# Patient Record
Sex: FEMALE | Race: WHITE | Employment: OTHER | RURAL
[De-identification: names, ages, dates, MRNs, and addresses within clinical notes are randomized per-mention and may not be internally consistent; named-entity substitution may affect disease eponyms.]

---

## 2018-07-08 PROBLEM — R29.810 FACIAL DROOP: Status: ACTIVE | Noted: 2018-07-08

## 2018-07-09 PROBLEM — G51.0 FACIAL NERVE PALSY: Status: ACTIVE | Noted: 2018-07-08

## 2018-07-09 PROBLEM — H25.11 AGE-RELATED NUCLEAR CATARACT OF RIGHT EYE: Status: ACTIVE | Noted: 2018-02-13

## 2018-07-10 PROBLEM — M25.562 ARTHRALGIA OF BOTH KNEES: Status: ACTIVE | Noted: 2018-07-10

## 2018-07-10 PROBLEM — A69.20 ERYTHEMA MIGRANS (LYME DISEASE): Status: ACTIVE | Noted: 2018-07-10

## 2018-07-10 PROBLEM — A69.20: Status: ACTIVE | Noted: 2018-07-10

## 2018-07-10 PROBLEM — M25.561 ARTHRALGIA OF BOTH KNEES: Status: ACTIVE | Noted: 2018-07-10

## 2019-02-10 PROBLEM — K85.90 ACUTE PANCREATITIS: Status: ACTIVE | Noted: 2019-02-10

## 2021-08-16 ENCOUNTER — HOSPITAL ENCOUNTER (OUTPATIENT)
Dept: MAMMOGRAPHY | Age: 76
Discharge: HOME | End: 2021-08-16
Attending: FAMILY MEDICINE
Payer: MEDICARE

## 2021-08-16 VITALS — WEIGHT: 183 LBS | BODY MASS INDEX: 29.54 KG/M2

## 2021-08-16 DIAGNOSIS — Z12.31 VISIT FOR SCREENING MAMMOGRAM: ICD-10-CM

## 2021-08-16 PROCEDURE — 77063 BREAST TOMOSYNTHESIS BI: CPT

## 2022-03-19 PROBLEM — A69.20: Status: ACTIVE | Noted: 2018-07-10

## 2022-03-19 PROBLEM — A69.20 ERYTHEMA MIGRANS (LYME DISEASE): Status: ACTIVE | Noted: 2018-07-10

## 2022-03-19 PROBLEM — H25.11 AGE-RELATED NUCLEAR CATARACT OF RIGHT EYE: Status: ACTIVE | Noted: 2018-02-13

## 2022-03-19 PROBLEM — K85.90 ACUTE PANCREATITIS: Status: ACTIVE | Noted: 2019-02-10

## 2022-03-19 PROBLEM — M25.561 ARTHRALGIA OF BOTH KNEES: Status: ACTIVE | Noted: 2018-07-10

## 2022-03-19 PROBLEM — G51.0 FACIAL NERVE PALSY: Status: ACTIVE | Noted: 2018-07-08

## 2022-03-19 PROBLEM — M25.562 ARTHRALGIA OF BOTH KNEES: Status: ACTIVE | Noted: 2018-07-10

## 2022-04-28 ENCOUNTER — APPOINTMENT (OUTPATIENT)
Dept: ULTRASOUND IMAGING | Age: 77
End: 2022-04-28
Attending: EMERGENCY MEDICINE
Payer: MEDICARE

## 2022-04-28 ENCOUNTER — HOSPITAL ENCOUNTER (EMERGENCY)
Age: 77
Discharge: HOME | End: 2022-04-28
Attending: EMERGENCY MEDICINE
Payer: MEDICARE

## 2022-04-28 ENCOUNTER — APPOINTMENT (OUTPATIENT)
Dept: GENERAL RADIOLOGY | Age: 77
End: 2022-04-28
Attending: EMERGENCY MEDICINE
Payer: MEDICARE

## 2022-04-28 VITALS
OXYGEN SATURATION: 96 % | DIASTOLIC BLOOD PRESSURE: 60 MMHG | WEIGHT: 180 LBS | RESPIRATION RATE: 17 BRPM | TEMPERATURE: 98.3 F | HEART RATE: 63 BPM | SYSTOLIC BLOOD PRESSURE: 123 MMHG | HEIGHT: 65 IN | BODY MASS INDEX: 29.99 KG/M2

## 2022-04-28 DIAGNOSIS — M25.562 ACUTE PAIN OF LEFT KNEE: Primary | ICD-10-CM

## 2022-04-28 DIAGNOSIS — S83.92XA SPRAIN OF LEFT KNEE, UNSPECIFIED LIGAMENT, INITIAL ENCOUNTER: ICD-10-CM

## 2022-04-28 PROCEDURE — 99284 EMERGENCY DEPT VISIT MOD MDM: CPT

## 2022-04-28 PROCEDURE — 73562 X-RAY EXAM OF KNEE 3: CPT

## 2022-04-28 PROCEDURE — 93971 EXTREMITY STUDY: CPT

## 2022-04-28 RX ORDER — HYDROCODONE BITARTRATE AND ACETAMINOPHEN 5; 325 MG/1; MG/1
1 TABLET ORAL
Qty: 10 TABLET | Refills: 0 | Status: SHIPPED | OUTPATIENT
Start: 2022-04-28 | End: 2022-05-01

## 2022-04-28 NOTE — ED TRIAGE NOTES
Pt arrived POV with c/o L knee pain after twisting it coming down stairs yesterday. Pt able to ambulate back to triage room.

## 2022-04-30 NOTE — ED PROVIDER NOTES
EMERGENCY DEPARTMENT HISTORY AND PHYSICAL EXAM      Date: 4/28/2022  Patient Name: Brunilda Crawford    History of Presenting Illness     Chief Complaint   Patient presents with    Knee Pain       History Provided By: Patient    HPI: Brunilda Crawford, 68 y.o. female presents to the ED with cc of left knee pain. Patient states that she was coming down the steps and stepped wrong and began having pain in her left knee. She states pain is rated 3 out of 10 worse with weightbearing activities. She states decreased range of motion secondary to pain. She denies any other pain or discomfort or injury. She states that she did have recent travel. No prior history of blood clots. She denies any chest pain or shortness of breath. She denies striking her head there was no loss of conscious. She denies any neck or back pain. There are no other complaints, changes, or physical findings at this time. PCP: Alexi Zafar MD    No current facility-administered medications on file prior to encounter. Current Outpatient Medications on File Prior to Encounter   Medication Sig Dispense Refill    rosuvastatin (CRESTOR) 10 mg tablet Take 10 mg by mouth nightly.  artificial tears, dextran 70-hypromellose, (NATURAL BALANCE) 0.1-0.3 % ophthalmic solution Administer 2 Drops to left eye four (4) times daily as needed. Indications: Dry Eye 15 mL 1    lisinopril-hydroCHLOROthiazide (PRINZIDE, ZESTORETIC) 10-12.5 mg per tablet Take 1 Tab by mouth daily.  multivitamin (ONE A DAY) tablet Take 1 Tab by mouth daily.  Calcium-Cholecalciferol, D3, 600 mg(1,500mg) -400 unit cap Take 1 Tab by mouth.          Past History     Past Medical History:  Past Medical History:   Diagnosis Date    Breast cancer ([address]     left with lumpectomy    Radiation therapy complication        Past Surgical History:  Past Surgical History:   Procedure Laterality Date    HX BREAST LUMPECTOMY Left 1993    positive    HX BREAST REDUCTION Bilateral 2002       Family History:  Family History   Problem Relation Age of Onset    Breast Cancer Maternal Aunt        Social History:  Social History     Tobacco Use    Smoking status: Never Smoker    Smokeless tobacco: Never Used   Substance Use Topics    Alcohol use: Yes    Drug use: Not on file       Allergies: Allergies   Allergen Reactions    Statins-Hmg-Coa Reductase Inhibitors Rash    Iodine Rash         Review of Systems   Review of Systems   Constitutional: Negative. Negative for appetite change, chills, fatigue and fever. HENT: Negative. Negative for congestion, rhinorrhea, sinus pressure and sore throat. Eyes: Negative. Respiratory: Negative. Negative for cough, choking, chest tightness, shortness of breath and wheezing. Cardiovascular: Negative. Negative for chest pain, palpitations and leg swelling. Endocrine: Negative. Genitourinary: Negative. Negative for difficulty urinating, dysuria, flank pain and urgency. Musculoskeletal:        Left knee pain      Skin: Negative. Neurological: Negative. Negative for dizziness, speech difficulty, weakness, light-headedness, numbness and headaches. Psychiatric/Behavioral: Negative. All other systems reviewed and are negative. Physical Exam   Physical Exam  Vitals and nursing note reviewed. Constitutional:       General: She is not in acute distress. Appearance: She is well-developed. She is obese. She is not diaphoretic. HENT:      Head: Normocephalic and atraumatic. Mouth/Throat:      Mouth: Mucous membranes are moist.      Pharynx: No oropharyngeal exudate. Eyes:      Extraocular Movements: Extraocular movements intact. Conjunctiva/sclera: Conjunctivae normal.      Pupils: Pupils are equal, round, and reactive to light. Neck:      Vascular: No JVD. Trachea: No tracheal deviation. Cardiovascular:      Rate and Rhythm: Normal rate and regular rhythm.       Heart sounds: Normal heart sounds. No murmur heard. Pulmonary:      Effort: Pulmonary effort is normal. No respiratory distress. Breath sounds: Normal breath sounds. No stridor. Musculoskeletal:         General: No swelling, tenderness or deformity. Cervical back: Normal range of motion and neck supple. Comments: Decrease ROM to left knee due to pain, no effusion noted, no swelliing, + ttp along lateral aspect and posterior calf, distal PMS intact, no pain proximal or distal to knee   Skin:     General: Skin is warm and dry. Neurological:      Mental Status: She is alert and oriented to person, place, and time. Cranial Nerves: No cranial nerve deficit. Comments: No gross motor or sensory deficits    Psychiatric:         Mood and Affect: Mood normal.         Behavior: Behavior normal.         Diagnostic Study Results     Labs -  None    Radiologic Studies -   DUPLEX LOWER EXT VENOUS LEFT   Final Result   Neg for US     XR KNEE LT 3 V   Final Result   1. No evidence of acute traumatic injury. 2. No knee joint effusion identified. 3. Evidence of degenerative change. 4. Findings suggestive of the presence of an enchondroma within the proximal   tibia. CT Results  (Last 48 hours)    None        CXR Results  (Last 48 hours)    None          Medical Decision Making   I am the first provider for this patient. I reviewed the vital signs, available nursing notes, past medical history, past surgical history, family history and social history. Vital Signs-Reviewed the patient's vital signs. Records Reviewed: Nursing Notes, Old Medical Records, Previous Radiology Studies and Previous Laboratory Studies, patient with a history of diabetes, no prior history of clots    Provider Notes (Medical Decision Making):   DDx- DVT, knee sprain, internal knee derangement, knee fx    ED Course:   Initial assessment performed.  The patients presenting problems have been discussed, and they are in agreement with the care plan formulated and outlined with them. I have encouraged them to ask questions as they arise throughout their visit. X-rays negative for bony injury. No blood clot noted. Patient knee wrapped by me with Ace wrap. Ace wrap provided for compression. Discussed with patient follow-up with orthopedic surgery if pain does not improve. She should ice 20 minutes on 3 times a day for the next 2 days. Will prescribe some pain medication discussed NSAIDs. Discussed that she could have potential cartilage or ligamentous injury however this would likely require an MRI to be diagnosed and she can follow-up with orthopedic surgery if her pain does not improve. Disposition:  Dc home    DISCHARGE PLAN:  1. Discharge Medication List as of 4/28/2022  2:25 PM      START taking these medications    Details   HYDROcodone-acetaminophen (Norco) 5-325 mg per tablet Take 1 Tablet by mouth every six (6) hours as needed for Pain for up to 3 days. Max Daily Amount: 4 Tablets., Normal, Disp-10 Tablet, R-0         CONTINUE these medications which have NOT CHANGED    Details   rosuvastatin (CRESTOR) 10 mg tablet Take 10 mg by mouth nightly., Historical Med      artificial tears, dextran 70-hypromellose, (NATURAL BALANCE) 0.1-0.3 % ophthalmic solution Administer 2 Drops to left eye four (4) times daily as needed. Indications: Dry Eye, Print, Disp-15 mL, R-1      lisinopril-hydroCHLOROthiazide (PRINZIDE, ZESTORETIC) 10-12.5 mg per tablet Take 1 Tab by mouth daily. , Historical Med      multivitamin (ONE A DAY) tablet Take 1 Tab by mouth daily. , Historical Med      Calcium-Cholecalciferol, D3, 600 mg(1,500mg) -400 unit cap Take 1 Tab by mouth., Historical Med           2.    Follow-up Information     Follow up With Specialties Details Why Contact Info    Hosea Urban MD Family Medicine   [address] [phone]      Tori Camacho MD Orthopedic Surgery   [address]  [address]  [phone]          3. Return to ED if worse     Diagnosis     Clinical Impression:   1. Acute pain of left knee    2. Sprain of left knee, unspecified ligament, initial encounter        Attestations:    Darien Bertrand, DO    Please note that this dictation was completed with [employer], the computer voice recognition software. Quite often unanticipated grammatical, syntax, homophones, and other interpretive errors are inadvertently transcribed by the computer software. Please disregard these errors. Please excuse any errors that have escaped final proofreading. Thank you.

## 2022-08-17 ENCOUNTER — HOSPITAL ENCOUNTER (OUTPATIENT)
Dept: MAMMOGRAPHY | Age: 77
Discharge: HOME | End: 2022-08-17
Attending: FAMILY MEDICINE
Payer: MEDICARE

## 2022-08-17 DIAGNOSIS — Z12.31 VISIT FOR SCREENING MAMMOGRAM: ICD-10-CM

## 2022-08-17 PROCEDURE — 77063 BREAST TOMOSYNTHESIS BI: CPT

## 2023-05-22 RX ORDER — LISINOPRIL AND HYDROCHLOROTHIAZIDE 12.5; 1 MG/1; MG/1
1 TABLET ORAL DAILY
COMMUNITY

## 2023-05-22 RX ORDER — ROSUVASTATIN CALCIUM 10 MG/1
10 TABLET, COATED ORAL NIGHTLY
COMMUNITY